# Patient Record
Sex: FEMALE | Race: WHITE | NOT HISPANIC OR LATINO
[De-identification: names, ages, dates, MRNs, and addresses within clinical notes are randomized per-mention and may not be internally consistent; named-entity substitution may affect disease eponyms.]

---
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
## 2018-08-07 ENCOUNTER — APPOINTMENT (OUTPATIENT)
Dept: OBGYN | Facility: CLINIC | Age: 18
End: 2018-08-07
Payer: COMMERCIAL

## 2018-08-07 VITALS
HEART RATE: 89 BPM | HEIGHT: 63.5 IN | WEIGHT: 146.5 LBS | DIASTOLIC BLOOD PRESSURE: 82 MMHG | SYSTOLIC BLOOD PRESSURE: 140 MMHG | BODY MASS INDEX: 25.64 KG/M2

## 2018-08-07 DIAGNOSIS — N92.6 IRREGULAR MENSTRUATION, UNSPECIFIED: ICD-10-CM

## 2018-08-07 DIAGNOSIS — N94.6 DYSMENORRHEA, UNSPECIFIED: ICD-10-CM

## 2018-08-07 DIAGNOSIS — Z78.9 OTHER SPECIFIED HEALTH STATUS: ICD-10-CM

## 2018-08-07 DIAGNOSIS — N92.0 EXCESSIVE AND FREQUENT MENSTRUATION WITH REGULAR CYCLE: ICD-10-CM

## 2018-08-07 DIAGNOSIS — N94.2 VAGINISMUS: ICD-10-CM

## 2018-08-07 PROCEDURE — 99203 OFFICE O/P NEW LOW 30 MIN: CPT

## 2018-08-07 RX ORDER — PREDNISONE 20 MG/1
20 TABLET ORAL
Qty: 16 | Refills: 0 | Status: COMPLETED | COMMUNITY
Start: 2018-05-30

## 2018-08-07 RX ORDER — AMOXICILLIN 875 MG/1
875 TABLET, FILM COATED ORAL
Qty: 20 | Refills: 0 | Status: COMPLETED | COMMUNITY
Start: 2018-03-14

## 2018-08-07 RX ORDER — AZITHROMYCIN 250 MG/1
250 TABLET, FILM COATED ORAL
Qty: 6 | Refills: 0 | Status: COMPLETED | COMMUNITY
Start: 2018-05-26

## 2018-08-08 ENCOUNTER — TRANSCRIPTION ENCOUNTER (OUTPATIENT)
Age: 18
End: 2018-08-08

## 2018-08-08 LAB
C TRACH RRNA SPEC QL NAA+PROBE: NOT DETECTED
N GONORRHOEA RRNA SPEC QL NAA+PROBE: NOT DETECTED
SOURCE AMPLIFICATION: NORMAL

## 2018-08-09 ENCOUNTER — ASOB RESULT (OUTPATIENT)
Age: 18
End: 2018-08-09

## 2018-08-09 ENCOUNTER — APPOINTMENT (OUTPATIENT)
Dept: OBGYN | Facility: CLINIC | Age: 18
End: 2018-08-09
Payer: COMMERCIAL

## 2018-08-09 PROCEDURE — 76856 US EXAM PELVIC COMPLETE: CPT

## 2019-06-26 ENCOUNTER — APPOINTMENT (OUTPATIENT)
Dept: ALLERGY | Facility: CLINIC | Age: 19
End: 2019-06-26
Payer: COMMERCIAL

## 2019-06-26 VITALS
HEIGHT: 63.5 IN | WEIGHT: 154 LBS | OXYGEN SATURATION: 98 % | DIASTOLIC BLOOD PRESSURE: 70 MMHG | SYSTOLIC BLOOD PRESSURE: 120 MMHG | RESPIRATION RATE: 16 BRPM | HEART RATE: 85 BPM | BODY MASS INDEX: 26.95 KG/M2

## 2019-06-26 PROCEDURE — 95018 ALL TSTG PERQ&IQ DRUGS/BIOL: CPT

## 2019-06-26 PROCEDURE — 99204 OFFICE O/P NEW MOD 45 MIN: CPT | Mod: 25

## 2019-06-26 PROCEDURE — 31231 NASAL ENDOSCOPY DX: CPT

## 2019-06-26 PROCEDURE — 94060 EVALUATION OF WHEEZING: CPT

## 2019-06-26 PROCEDURE — 95004 PERQ TESTS W/ALRGNC XTRCS: CPT

## 2019-06-26 RX ORDER — METHOCARBAMOL 500 MG/1
500 TABLET, FILM COATED ORAL
Qty: 5 | Refills: 0 | Status: DISCONTINUED | COMMUNITY
Start: 2018-06-05 | End: 2019-06-26

## 2019-06-26 RX ORDER — NORETHINDRONE 0.35 MG/1
0.35 TABLET ORAL DAILY
Qty: 84 | Refills: 3 | Status: DISCONTINUED | COMMUNITY
Start: 2018-08-07 | End: 2019-06-26

## 2019-06-26 NOTE — SOCIAL HISTORY
[Mother] : mother [Father] : father [House] : [unfilled] lives in a house  [Radiator/Baseboard] : heating provided by radiator(s)/baseboard(s) [Central] : air conditioning provided by central unit [Dog] : dog [Single] : single [Smokers in Household] : there are smokers in the home [Bedroom] : not in the bedroom [FreeTextEntry2] : [facility]  [de-identified] : vapes in the home on occasion [Basement] : not in the basement [Living Area] : not in the living area

## 2019-06-26 NOTE — PHYSICAL EXAM
[Alert] : alert [Well Nourished] : well nourished [Healthy Appearance] : healthy appearance [No Acute Distress] : no acute distress [Well Developed] : well developed [Normal Pupil & Iris Size/Symmetry] : normal pupil and iris size and symmetry [No Discharge] : no discharge [No Photophobia] : no photophobia [Sclera Not Icteric] : sclera not icteric [Normal Nasal Mucosa] : the nasal mucosa was normal [Normal Lips/Tongue] : the lips and tongue were normal [Normal Tonsils] : normal tonsils [Normal Dentition] : normal dentition [No Oral Lesions or Ulcers] : no oral lesions or ulcers [No Neck Mass] : no neck mass was observed [No LAD] : no lymphadenopathy [No Thyroid Mass] : no thyroid mass [Supple] : the neck was supple [Normal Rate and Effort] : normal respiratory rhythm and effort [Normal Palpation] : palpation of the chest revealed no abnormalities [No Crackles] : no crackles [No Retractions] : no retractions [Bilateral Audible Breath Sounds] : bilateral audible breath sounds [Normal Rate] : heart rate was normal  [Normal S1, S2] : normal S1 and S2 [Regular Rhythm] : with a regular rhythm [Normal Cervical Lymph Nodes] : cervical [Normal Axillary Lumph Nodes] : axillary [Skin Intact] : skin intact  [No Rash] : no rash [No Skin Lesions] : no skin lesions [No Joint Swelling or Erythema] : no joint swelling or erythema [No clubbing] : no clubbing [No Edema] : no edema [No Cyanosis] : no cyanosis [Normal Mood] : mood was normal [Normal Affect] : affect was normal [Alert, Awake, Oriented as Age-Appropriate] : alert, awake, oriented as age appropriate [Boggy Nasal Turbinates] : no boggy and/or pale nasal turbinates [Posterior Pharyngeal Cobblestoning] : no posterior pharyngeal cobblestoning [de-identified] : soft systolic murmur

## 2019-06-26 NOTE — ASSESSMENT
[FreeTextEntry1] : Recurrent sinusitis - deviated septum\par \par Hypertonic saline TID\par Flonase QD\par RV environmental intradermal skin testing \par \par Perennial allergic rhinitis:\par \par Mild intermittent asthma:\par \par Proventil 2 puffs QID prn \par \par Heart murmur:  I will give Ms. Finn a cardiologist for follow up

## 2019-06-26 NOTE — IMPRESSION
[Spirometry] : Spirometry [Reversible] :  with reversibility. [Mild] : (mild) [Allergy Testing Dog] : dog [Allergy Testing Dust Mite] : dust mites [Allergy Testing Cockroach] : cockroach [Allergy Testing Cat] : cat [] : molds [Allergy Testing Trees] : trees [Allergy Testing Weeds] : weeds [FreeTextEntry2] : deviated septum\par septal spur [Allergy Testing Grasses] : grasses

## 2019-07-10 ENCOUNTER — APPOINTMENT (OUTPATIENT)
Dept: ALLERGY | Facility: CLINIC | Age: 19
End: 2019-07-10
Payer: COMMERCIAL

## 2019-07-10 VITALS
WEIGHT: 154 LBS | HEIGHT: 63.5 IN | DIASTOLIC BLOOD PRESSURE: 80 MMHG | BODY MASS INDEX: 26.95 KG/M2 | RESPIRATION RATE: 16 BRPM | SYSTOLIC BLOOD PRESSURE: 110 MMHG | HEART RATE: 74 BPM | OXYGEN SATURATION: 98 %

## 2019-07-10 PROCEDURE — 95018 ALL TSTG PERQ&IQ DRUGS/BIOL: CPT

## 2019-07-10 PROCEDURE — 95024 IQ TESTS W/ALLERGENIC XTRCS: CPT

## 2019-07-10 PROCEDURE — 99213 OFFICE O/P EST LOW 20 MIN: CPT | Mod: 25

## 2019-07-10 NOTE — PHYSICAL EXAM
[Alert] : alert [Well Nourished] : well nourished [No Acute Distress] : no acute distress [Healthy Appearance] : healthy appearance [Well Developed] : well developed [No Discharge] : no discharge [Normal Pupil & Iris Size/Symmetry] : normal pupil and iris size and symmetry [No Photophobia] : no photophobia [Sclera Not Icteric] : sclera not icteric [Normal Nasal Mucosa] : the nasal mucosa was normal [Normal Tonsils] : normal tonsils [Normal Lips/Tongue] : the lips and tongue were normal [No Oral Lesions or Ulcers] : no oral lesions or ulcers [Normal Dentition] : normal dentition [Boggy Nasal Turbinates] : no boggy and/or pale nasal turbinates [Posterior Pharyngeal Cobblestoning] : no posterior pharyngeal cobblestoning [No Neck Mass] : no neck mass was observed [No LAD] : no lymphadenopathy [No Thyroid Mass] : no thyroid mass [Supple] : the neck was supple [Normal Rate and Effort] : normal respiratory rhythm and effort [Normal Palpation] : palpation of the chest revealed no abnormalities [No Crackles] : no crackles [No Retractions] : no retractions [Bilateral Audible Breath Sounds] : bilateral audible breath sounds [Normal Rate] : heart rate was normal  [Normal S1, S2] : normal S1 and S2 [Normal Cervical Lymph Nodes] : cervical [Regular Rhythm] : with a regular rhythm [Normal Axillary Lumph Nodes] : axillary [Skin Intact] : skin intact  [No Rash] : no rash [No Skin Lesions] : no skin lesions [No Joint Swelling or Erythema] : no joint swelling or erythema [No clubbing] : no clubbing [No Edema] : no edema [No Cyanosis] : no cyanosis [Normal Affect] : affect was normal [Normal Mood] : mood was normal [Alert, Awake, Oriented as Age-Appropriate] : alert, awake, oriented as age appropriate [de-identified] : soft systolic murmur

## 2019-07-10 NOTE — ASSESSMENT
[FreeTextEntry1] : Perennial allergic rhinitis:\par \par Continue hypertonic saline\par Continue Flonase \par I have reviewed the option of Odactra with patient and patient has information booklet\par Dust mite avoidance reviewed.

## 2019-07-10 NOTE — SOCIAL HISTORY
[FreeTextEntry2] : [facility]  [Father] : father [Mother] : mother [Radiator/Baseboard] : heating provided by radiator(s)/baseboard(s) [House] : [unfilled] lives in a house  [Central] : air conditioning provided by central unit [Basement] : not in the basement [Bedroom] : not in the bedroom [Living Area] : not in the living area [Dog] : dog [de-identified] : vapes in the home on occasion [Single] : single [Smokers in Household] : there are smokers in the home

## 2019-07-10 NOTE — HISTORY OF PRESENT ILLNESS
[Venom Reactions] : venom reactions [Eczematous rashes] : eczematous rashes [de-identified] : Patient has improved with hypertonic saline and Flonase.   No chest symptoms.  [Food Allergies] : food allergies

## 2020-11-23 ENCOUNTER — TRANSCRIPTION ENCOUNTER (OUTPATIENT)
Age: 20
End: 2020-11-23

## 2021-02-23 NOTE — HISTORY OF PRESENT ILLNESS
[Eczematous rashes] : eczematous rashes [Venom Reactions] : venom reactions [Food Allergies] : food allergies [de-identified] : Patient reports AM and PM nasal congestion ongoing x 6 months - prior to this time she had milder symptoms.  In the AM she feels a sense of PND and gagging with clear mucus.   When she gets a URI she will have a sinus infection - sinus pressure - congestion - headaches - discolored mucus - treated with antibiotics Q 3 months.    No season allergies. \par \par Patient with EIB and asthma with URI.   She will add QVAR when she has a URI.   She will use rescue inhaler before exercise. \par \par Patient uses Flonase only.  Birth Control Pills Pregnancy And Lactation Text: This medication should be avoided if pregnant and for the first 30 days post-partum.

## 2021-05-20 ENCOUNTER — APPOINTMENT (OUTPATIENT)
Dept: PLASTIC SURGERY | Facility: CLINIC | Age: 21
End: 2021-05-20
Payer: COMMERCIAL

## 2021-05-20 VITALS
HEART RATE: 118 BPM | HEIGHT: 63 IN | OXYGEN SATURATION: 97 % | DIASTOLIC BLOOD PRESSURE: 84 MMHG | SYSTOLIC BLOOD PRESSURE: 144 MMHG | TEMPERATURE: 98.1 F | WEIGHT: 155 LBS | BODY MASS INDEX: 27.46 KG/M2

## 2021-05-20 DIAGNOSIS — J45.990 EXERCISE INDUCED BRONCHOSPASM: ICD-10-CM

## 2021-05-20 PROCEDURE — 99203 OFFICE O/P NEW LOW 30 MIN: CPT

## 2021-05-20 PROCEDURE — 99072 ADDL SUPL MATRL&STAF TM PHE: CPT

## 2021-05-20 NOTE — PHYSICAL EXAM
[de-identified] : alert, calm, cooperative\par  [de-identified] : respirations are even and unlabored\par  [de-identified] : large, pendulous breasts.  No masses, tenderness upon palpation, or nipple discharge appreciated.  Grade III ptosis bilaterally.  Right breast lower than left, left breast larger than right.  \par Measurements:\par Sternal notch to nipple:  Right Breast- 32 cm, Left Breast- 30.5 cm\par Nipple to IMF: Right Breast- 21 cm, Left Breast- 24 cm\par Base Width:  12 cm bilaterally\par Ptosis Grade:  III bilaterally\par \par There is significant breast asymmetry.

## 2021-05-20 NOTE — HISTORY OF PRESENT ILLNESS
[FreeTextEntry1] : Jenna is a 20 year old female who presents for an initial evaluation.  She is accompanied by her mother.  Patient complains of macromastia.  Current bra size is 34 HH, but she feels this bra size is still too small.  Her main complaints are migraine headaches, neck, shoulder, and back pain, intertrigo of the inframammary folds.  She suffered from leg pain last year and was evaluated by a rheumatologist, who felt this was likely related to her back pain secondary to macromastia.  At that time she also underwent lower extremity dopplers to rule out DVT, which was negative.  Medical history is notable for exercise induced asthma, migraines.  Surgical history is notable for tonsillectomy and adenoidectomy, 3rd molar extraction x 4.  \par \par The patient has failed conservative treatment including supportive garments (special support bras with wide straps), NSAIDS, weight loss, dressing changes, and antifungals. Conservative treatment has been attempted for six months. In addition, the patient has required systemic antibiotics causing functional impairment (missed work and school days).  The patient has also attempted physical therapy, with little relief.  \par \par All other providers including dermatology, physical therapy, and primary care have all indicated that breast reduction is the only options for this patient.  These providers will no longer offer any more conservative treatment, as this has failed for over 6 months.\par \par \par

## 2021-05-20 NOTE — ASSESSMENT
[FreeTextEntry1] : Documented by Aileen Mccall NP acting as a scribe for Skyler Das MD. All medical record entries made by the Scribe were at my, Dr. Skyler Das, direction and personally dictated by me. I have reviewed the chart and agree that the record accurately reflects my personal performance of the history, physical exam, assessment and plan.\par

## 2021-06-22 ENCOUNTER — OUTPATIENT (OUTPATIENT)
Dept: OUTPATIENT SERVICES | Facility: HOSPITAL | Age: 21
LOS: 1 days | End: 2021-06-22
Payer: COMMERCIAL

## 2021-06-22 VITALS
HEIGHT: 63 IN | TEMPERATURE: 99 F | DIASTOLIC BLOOD PRESSURE: 81 MMHG | SYSTOLIC BLOOD PRESSURE: 126 MMHG | WEIGHT: 158.73 LBS | OXYGEN SATURATION: 100 % | RESPIRATION RATE: 14 BRPM | HEART RATE: 84 BPM

## 2021-06-22 DIAGNOSIS — M54.6 PAIN IN THORACIC SPINE: ICD-10-CM

## 2021-06-22 DIAGNOSIS — N62 HYPERTROPHY OF BREAST: ICD-10-CM

## 2021-06-22 DIAGNOSIS — Z90.89 ACQUIRED ABSENCE OF OTHER ORGANS: Chronic | ICD-10-CM

## 2021-06-22 DIAGNOSIS — Z01.818 ENCOUNTER FOR OTHER PREPROCEDURAL EXAMINATION: ICD-10-CM

## 2021-06-22 DIAGNOSIS — M54.2 CERVICALGIA: ICD-10-CM

## 2021-06-22 DIAGNOSIS — J45.990 EXERCISE INDUCED BRONCHOSPASM: ICD-10-CM

## 2021-06-22 DIAGNOSIS — L30.4 ERYTHEMA INTERTRIGO: ICD-10-CM

## 2021-06-22 LAB
HCG SERPL-ACNC: <1 MIU/ML — SIGNIFICANT CHANGE UP
HCT VFR BLD CALC: 42.2 % — SIGNIFICANT CHANGE UP (ref 34.5–45)
HGB BLD-MCNC: 14.3 G/DL — SIGNIFICANT CHANGE UP (ref 11.5–15.5)
MCHC RBC-ENTMCNC: 30.2 PG — SIGNIFICANT CHANGE UP (ref 27–34)
MCHC RBC-ENTMCNC: 33.9 GM/DL — SIGNIFICANT CHANGE UP (ref 32–36)
MCV RBC AUTO: 89 FL — SIGNIFICANT CHANGE UP (ref 80–100)
NRBC # BLD: 0 /100 WBCS — SIGNIFICANT CHANGE UP (ref 0–0)
PLATELET # BLD AUTO: 341 K/UL — SIGNIFICANT CHANGE UP (ref 150–400)
RBC # BLD: 4.74 M/UL — SIGNIFICANT CHANGE UP (ref 3.8–5.2)
RBC # FLD: 12.5 % — SIGNIFICANT CHANGE UP (ref 10.3–14.5)
WBC # BLD: 7.59 K/UL — SIGNIFICANT CHANGE UP (ref 3.8–10.5)
WBC # FLD AUTO: 7.59 K/UL — SIGNIFICANT CHANGE UP (ref 3.8–10.5)

## 2021-06-22 PROCEDURE — 85027 COMPLETE CBC AUTOMATED: CPT

## 2021-06-22 PROCEDURE — G0463: CPT

## 2021-06-22 PROCEDURE — 36415 COLL VENOUS BLD VENIPUNCTURE: CPT

## 2021-06-22 PROCEDURE — 84702 CHORIONIC GONADOTROPIN TEST: CPT

## 2021-06-22 RX ORDER — SODIUM CHLORIDE 9 MG/ML
1000 INJECTION, SOLUTION INTRAVENOUS
Refills: 0 | Status: DISCONTINUED | OUTPATIENT
Start: 2021-06-25 | End: 2021-07-09

## 2021-06-22 NOTE — H&P PST ADULT - HISTORY OF PRESENT ILLNESS
21yo female with medical h/o Asthma c/o Large breast causing pain to neck and back. Pt presents today for PST for scheduled Bilateral Breast Reduction on 6/25/2021

## 2021-06-22 NOTE — H&P PST ADULT - NSICDXPROBLEM_GEN_ALL_CORE_FT
PROBLEM DIAGNOSES  Problem: Hypertrophy of breast  Assessment and Plan: Pre-op instructions given. Pt verbalized understanding  Chlorhexidine wash instructions given  Covid vaccine proof in chart   Pending: M/C (requested by surgeon pt states)    Problem: Cervicalgia  Assessment and Plan: noted    Problem: Asthma, exercise induced  Assessment and Plan: Pt instructed to take meds

## 2021-06-22 NOTE — H&P PST ADULT - NSANTHOSAYNRD_GEN_A_CORE
neck 12 3/4inches/No. ANETA screening performed.  STOP BANG Legend: 0-2 = LOW Risk; 3-4 = INTERMEDIATE Risk; 5-8 = HIGH Risk

## 2021-06-22 NOTE — H&P PST ADULT - NSICDXPASTMEDICALHX_GEN_ALL_CORE_FT
PAST MEDICAL HISTORY:  Asthma exercise induced    Elbow Dislocation fracture    Foot Fracture right    Large breasts     Thumb Fracture left    Wrist Fracture right

## 2021-06-24 ENCOUNTER — APPOINTMENT (OUTPATIENT)
Dept: PLASTIC SURGERY | Facility: CLINIC | Age: 21
End: 2021-06-24
Payer: COMMERCIAL

## 2021-06-24 ENCOUNTER — TRANSCRIPTION ENCOUNTER (OUTPATIENT)
Age: 21
End: 2021-06-24

## 2021-06-24 PROBLEM — N62 HYPERTROPHY OF BREAST: Chronic | Status: ACTIVE | Noted: 2021-06-22

## 2021-06-24 PROBLEM — J45.909 UNSPECIFIED ASTHMA, UNCOMPLICATED: Chronic | Status: ACTIVE | Noted: 2021-06-22

## 2021-06-24 PROCEDURE — 99214 OFFICE O/P EST MOD 30 MIN: CPT | Mod: 57

## 2021-06-24 PROCEDURE — 99072 ADDL SUPL MATRL&STAF TM PHE: CPT

## 2021-06-25 ENCOUNTER — RESULT REVIEW (OUTPATIENT)
Age: 21
End: 2021-06-25

## 2021-06-25 ENCOUNTER — APPOINTMENT (OUTPATIENT)
Dept: PLASTIC SURGERY | Facility: HOSPITAL | Age: 21
End: 2021-06-25
Payer: COMMERCIAL

## 2021-06-25 ENCOUNTER — OUTPATIENT (OUTPATIENT)
Dept: OUTPATIENT SERVICES | Facility: HOSPITAL | Age: 21
LOS: 1 days | End: 2021-06-25
Payer: COMMERCIAL

## 2021-06-25 VITALS
HEART RATE: 83 BPM | DIASTOLIC BLOOD PRESSURE: 82 MMHG | SYSTOLIC BLOOD PRESSURE: 122 MMHG | TEMPERATURE: 98 F | RESPIRATION RATE: 16 BRPM | OXYGEN SATURATION: 98 %

## 2021-06-25 VITALS
WEIGHT: 158.73 LBS | RESPIRATION RATE: 16 BRPM | OXYGEN SATURATION: 97 % | SYSTOLIC BLOOD PRESSURE: 124 MMHG | TEMPERATURE: 97 F | HEART RATE: 96 BPM | HEIGHT: 63 IN | DIASTOLIC BLOOD PRESSURE: 83 MMHG

## 2021-06-25 DIAGNOSIS — M54.6 PAIN IN THORACIC SPINE: ICD-10-CM

## 2021-06-25 DIAGNOSIS — L30.4 ERYTHEMA INTERTRIGO: ICD-10-CM

## 2021-06-25 DIAGNOSIS — N62 HYPERTROPHY OF BREAST: ICD-10-CM

## 2021-06-25 DIAGNOSIS — M54.2 CERVICALGIA: ICD-10-CM

## 2021-06-25 DIAGNOSIS — Z90.89 ACQUIRED ABSENCE OF OTHER ORGANS: Chronic | ICD-10-CM

## 2021-06-25 PROCEDURE — 88305 TISSUE EXAM BY PATHOLOGIST: CPT

## 2021-06-25 PROCEDURE — 19318 BREAST REDUCTION: CPT | Mod: 50

## 2021-06-25 PROCEDURE — 88305 TISSUE EXAM BY PATHOLOGIST: CPT | Mod: 26

## 2021-06-25 PROCEDURE — C1889: CPT

## 2021-06-25 PROCEDURE — 19318 BREAST REDUCTION: CPT | Mod: AS,RT

## 2021-06-25 PROCEDURE — 19318 BREAST REDUCTION: CPT | Mod: LT

## 2021-06-25 RX ORDER — SODIUM CHLORIDE 9 MG/ML
1000 INJECTION, SOLUTION INTRAVENOUS
Refills: 0 | Status: DISCONTINUED | OUTPATIENT
Start: 2021-06-25 | End: 2021-06-25

## 2021-06-25 RX ORDER — HYDROMORPHONE HYDROCHLORIDE 2 MG/ML
0.5 INJECTION INTRAMUSCULAR; INTRAVENOUS; SUBCUTANEOUS
Refills: 0 | Status: DISCONTINUED | OUTPATIENT
Start: 2021-06-25 | End: 2021-06-25

## 2021-06-25 RX ORDER — HYDROMORPHONE HYDROCHLORIDE 2 MG/ML
1 INJECTION INTRAMUSCULAR; INTRAVENOUS; SUBCUTANEOUS
Refills: 0 | Status: DISCONTINUED | OUTPATIENT
Start: 2021-06-25 | End: 2021-06-25

## 2021-06-25 RX ORDER — FLUTICASONE PROPIONATE 50 MCG
1 SPRAY, SUSPENSION NASAL
Qty: 0 | Refills: 0 | DISCHARGE

## 2021-06-25 RX ORDER — BECLOMETHASONE DIPROPIONATE 40 UG/1
1 AEROSOL, METERED RESPIRATORY (INHALATION)
Qty: 0 | Refills: 0 | DISCHARGE

## 2021-06-25 RX ORDER — OXYCODONE HYDROCHLORIDE 5 MG/1
5 TABLET ORAL EVERY 6 HOURS
Refills: 0 | Status: DISCONTINUED | OUTPATIENT
Start: 2021-06-25 | End: 2021-06-25

## 2021-06-25 RX ORDER — ONDANSETRON 8 MG/1
4 TABLET, FILM COATED ORAL ONCE
Refills: 0 | Status: COMPLETED | OUTPATIENT
Start: 2021-06-25 | End: 2021-06-25

## 2021-06-25 RX ORDER — ALBUTEROL 90 UG/1
2 AEROSOL, METERED ORAL
Qty: 0 | Refills: 0 | DISCHARGE

## 2021-06-25 RX ADMIN — ONDANSETRON 4 MILLIGRAM(S): 8 TABLET, FILM COATED ORAL at 16:21

## 2021-06-25 RX ADMIN — HYDROMORPHONE HYDROCHLORIDE 0.5 MILLIGRAM(S): 2 INJECTION INTRAMUSCULAR; INTRAVENOUS; SUBCUTANEOUS at 15:30

## 2021-06-25 RX ADMIN — HYDROMORPHONE HYDROCHLORIDE 0.5 MILLIGRAM(S): 2 INJECTION INTRAMUSCULAR; INTRAVENOUS; SUBCUTANEOUS at 15:40

## 2021-06-25 RX ADMIN — HYDROMORPHONE HYDROCHLORIDE 0.5 MILLIGRAM(S): 2 INJECTION INTRAMUSCULAR; INTRAVENOUS; SUBCUTANEOUS at 15:52

## 2021-06-25 RX ADMIN — HYDROMORPHONE HYDROCHLORIDE 0.5 MILLIGRAM(S): 2 INJECTION INTRAMUSCULAR; INTRAVENOUS; SUBCUTANEOUS at 16:00

## 2021-06-25 RX ADMIN — SODIUM CHLORIDE 50 MILLILITER(S): 9 INJECTION, SOLUTION INTRAVENOUS at 09:33

## 2021-06-25 NOTE — ASU PATIENT PROFILE, ADULT - PMH
Asthma  exercise induced  Elbow Dislocation  fracture  Foot Fracture  right  Large breasts    Thumb Fracture  left  Wrist Fracture  right

## 2021-06-25 NOTE — ASU DISCHARGE PLAN (ADULT/PEDIATRIC) - CARE PROVIDER_API CALL
Skyler Das; JIM)  Otolaryngology; Plastic Surgery  [address] [place] [address] [address]  [place] [address]  Phone: [phone]  Fax: [phone]  Follow Up Time:

## 2021-06-25 NOTE — ASU PREOP CHECKLIST - NEEDLE GAUGE
----- Message from Marshall Alvarez MD, MD sent at 11/9/2017  8:26 AM CST -----  Zak Castro,  Will you let her know her results? Her TSH is low and free T3 is high. This can be consistent with hyperthyroidism, which can be dangerous if untreated. I would recommend referral to endocrinology- Can we do Dr. Ashwin Charlton?   Thanks!  -Marshall Alvarez MD 20

## 2021-06-25 NOTE — ASU DISCHARGE PLAN (ADULT/PEDIATRIC) - NURSING INSTRUCTIONS
Dr. Das's office will call to schedule a follow-up appointment next week. All of discharge, safety, pain management, follow up with surgeon. Verbalized understanding of all instructions.

## 2021-07-01 ENCOUNTER — APPOINTMENT (OUTPATIENT)
Dept: PLASTIC SURGERY | Facility: CLINIC | Age: 21
End: 2021-07-01
Payer: COMMERCIAL

## 2021-07-01 PROCEDURE — 99024 POSTOP FOLLOW-UP VISIT: CPT

## 2021-07-02 LAB — SURGICAL PATHOLOGY STUDY: SIGNIFICANT CHANGE UP

## 2021-07-09 ENCOUNTER — APPOINTMENT (OUTPATIENT)
Dept: PLASTIC SURGERY | Facility: CLINIC | Age: 21
End: 2021-07-09
Payer: COMMERCIAL

## 2021-07-09 PROCEDURE — 99024 POSTOP FOLLOW-UP VISIT: CPT

## 2021-07-14 ENCOUNTER — APPOINTMENT (OUTPATIENT)
Dept: PLASTIC SURGERY | Facility: CLINIC | Age: 21
End: 2021-07-14
Payer: COMMERCIAL

## 2021-07-14 PROCEDURE — 99024 POSTOP FOLLOW-UP VISIT: CPT

## 2021-07-28 ENCOUNTER — APPOINTMENT (OUTPATIENT)
Dept: PLASTIC SURGERY | Facility: CLINIC | Age: 21
End: 2021-07-28
Payer: COMMERCIAL

## 2021-07-28 VITALS
HEART RATE: 100 BPM | WEIGHT: 151 LBS | DIASTOLIC BLOOD PRESSURE: 85 MMHG | SYSTOLIC BLOOD PRESSURE: 114 MMHG | BODY MASS INDEX: 26.75 KG/M2 | HEIGHT: 63 IN | OXYGEN SATURATION: 98 % | TEMPERATURE: 98.8 F

## 2021-07-28 PROCEDURE — 99024 POSTOP FOLLOW-UP VISIT: CPT

## 2021-08-11 ENCOUNTER — APPOINTMENT (OUTPATIENT)
Dept: PLASTIC SURGERY | Facility: CLINIC | Age: 21
End: 2021-08-11
Payer: COMMERCIAL

## 2021-08-11 PROCEDURE — 99024 POSTOP FOLLOW-UP VISIT: CPT

## 2021-10-04 ENCOUNTER — APPOINTMENT (OUTPATIENT)
Dept: PLASTIC SURGERY | Facility: CLINIC | Age: 21
End: 2021-10-04

## 2021-10-06 ENCOUNTER — APPOINTMENT (OUTPATIENT)
Dept: PLASTIC SURGERY | Facility: CLINIC | Age: 21
End: 2021-10-06
Payer: COMMERCIAL

## 2021-10-06 PROCEDURE — 99212 OFFICE O/P EST SF 10 MIN: CPT

## 2021-12-22 ENCOUNTER — APPOINTMENT (OUTPATIENT)
Dept: PLASTIC SURGERY | Facility: CLINIC | Age: 21
End: 2021-12-22
Payer: COMMERCIAL

## 2021-12-22 VITALS — HEIGHT: 63 IN | BODY MASS INDEX: 26.75 KG/M2 | WEIGHT: 151 LBS | TEMPERATURE: 97.9 F

## 2021-12-22 PROCEDURE — 99213 OFFICE O/P EST LOW 20 MIN: CPT

## 2022-06-27 ENCOUNTER — APPOINTMENT (OUTPATIENT)
Dept: PLASTIC SURGERY | Facility: CLINIC | Age: 22
End: 2022-06-27
Payer: COMMERCIAL

## 2022-06-27 VITALS
DIASTOLIC BLOOD PRESSURE: 84 MMHG | HEIGHT: 63 IN | SYSTOLIC BLOOD PRESSURE: 128 MMHG | WEIGHT: 150 LBS | BODY MASS INDEX: 26.58 KG/M2 | OXYGEN SATURATION: 98 % | TEMPERATURE: 98.1 F | HEART RATE: 84 BPM

## 2022-06-27 DIAGNOSIS — N62 HYPERTROPHY OF BREAST: ICD-10-CM

## 2022-06-27 PROCEDURE — 99024 POSTOP FOLLOW-UP VISIT: CPT

## 2022-08-02 NOTE — H&P PST ADULT - ENMT
What Type Of Note Output Would You Prefer (Optional)?: Standard Output What Is The Reason For Today's Visit?: Full Body Skin Examination What Is The Reason For Today's Visit? (Being Monitored For X): concerning skin lesions on an annual basis No oral lesions; no gross abnormalities negative

## 2022-12-18 ENCOUNTER — NON-APPOINTMENT (OUTPATIENT)
Age: 22
End: 2022-12-18

## 2023-03-14 ENCOUNTER — LABORATORY RESULT (OUTPATIENT)
Age: 23
End: 2023-03-14

## 2023-03-14 ENCOUNTER — APPOINTMENT (OUTPATIENT)
Dept: PULMONOLOGY | Facility: CLINIC | Age: 23
End: 2023-03-14
Payer: COMMERCIAL

## 2023-03-14 VITALS
BODY MASS INDEX: 26.22 KG/M2 | SYSTOLIC BLOOD PRESSURE: 136 MMHG | OXYGEN SATURATION: 98 % | HEIGHT: 63 IN | DIASTOLIC BLOOD PRESSURE: 84 MMHG | HEART RATE: 88 BPM | WEIGHT: 148 LBS

## 2023-03-14 DIAGNOSIS — J45.30 MILD PERSISTENT ASTHMA, UNCOMPLICATED: ICD-10-CM

## 2023-03-14 PROCEDURE — 99203 OFFICE O/P NEW LOW 30 MIN: CPT | Mod: 25

## 2023-03-14 PROCEDURE — 94729 DIFFUSING CAPACITY: CPT

## 2023-03-14 PROCEDURE — 95012 NITRIC OXIDE EXP GAS DETER: CPT

## 2023-03-14 PROCEDURE — ZZZZZ: CPT

## 2023-03-14 PROCEDURE — 94726 PLETHYSMOGRAPHY LUNG VOLUMES: CPT

## 2023-03-14 PROCEDURE — 94060 EVALUATION OF WHEEZING: CPT

## 2023-03-14 NOTE — HISTORY OF PRESENT ILLNESS
[TextBox_4] : 22 y.o. female with esophageal ulcer, migraines hx of mild persistent asthma here to establish care. The patient states she recently was recovering from a prolonged URI with cough, wheezing and dyspnea from January to February. States the cough was productive at that time with greenish sputum. She was treated with a course of prednisone, ceftin and cough syrup. Currently the patient has recovered and is asymptomatic. States during the change in seasons, after exercise and when she is sick, she wll get cough, congestion, shortness of breath and wheezing. After exercise she we will take her albuterol HFA but feels little relief. She is not on any controller medication, and was previously on Qvar years ago. The patient was seen by Allergy and Immunology in 2019 and was found to be allergic to dust mites. \par \par She is a never smoker and only drinks EToH socially. Works as a guidance counselor. No history of asthma exacerbations requiring hospitalizations or ED visits.

## 2023-03-14 NOTE — ASSESSMENT
[FreeTextEntry1] : 22 y.o. female with esophageal ulcer, migraines, mild persistent asthma here to establish care. PFT overall normal and FeNO as 12. Symptoms are provoked by exercise and by URIs.  Mild intermittent asthma. \par \par Plan \par - Start Symbicort PRN for SOB/wheezing \par - Check IgE, Asthma profile and CBC with Diff\par - Azelastine .1% nasal spray for PND\par - Follow up in 1 week\par \par d/w Dr. Cerda

## 2023-03-21 LAB
A ALTERNATA IGE QN: <0.1 KUA/L
A FUMIGATUS IGE QN: <0.1 KUA/L
BASOPHILS # BLD AUTO: 0.04 K/UL
BASOPHILS NFR BLD AUTO: 0.4 %
BERMUDA GRASS IGE QN: <0.1 KUA/L
BOXELDER IGE QN: <0.1 KUA/L
C HERBARUM IGE QN: <0.1 KUA/L
CALIF WALNUT IGE QN: <0.1 KUA/L
CAT DANDER IGE QN: <0.1 KUA/L
CMN PIGWEED IGE QN: <0.1 KUA/L
COMMON RAGWEED IGE QN: <0.1 KUA/L
COTTONWOOD IGE QN: <0.1 KUA/L
D FARINAE IGE QN: 0.17 KUA/L
D PTERONYSS IGE QN: 0.15 KUA/L
DEPRECATED A ALTERNATA IGE RAST QL: 0
DEPRECATED A FUMIGATUS IGE RAST QL: 0
DEPRECATED BERMUDA GRASS IGE RAST QL: 0
DEPRECATED BOXELDER IGE RAST QL: 0
DEPRECATED C HERBARUM IGE RAST QL: 0
DEPRECATED CAT DANDER IGE RAST QL: 0
DEPRECATED COMMON PIGWEED IGE RAST QL: 0
DEPRECATED COMMON RAGWEED IGE RAST QL: 0
DEPRECATED COTTONWOOD IGE RAST QL: 0
DEPRECATED D FARINAE IGE RAST QL: NORMAL
DEPRECATED D PTERONYSS IGE RAST QL: NORMAL
DEPRECATED DOG DANDER IGE RAST QL: 0
DEPRECATED GOOSEFOOT IGE RAST QL: NORMAL
DEPRECATED LONDON PLANE IGE RAST QL: NORMAL
DEPRECATED MOUSE URINE PROT IGE RAST QL: 0
DEPRECATED MUGWORT IGE RAST QL: 0
DEPRECATED P NOTATUM IGE RAST QL: 0
DEPRECATED RED CEDAR IGE RAST QL: NORMAL
DEPRECATED ROACH IGE RAST QL: 0
DEPRECATED SHEEP SORREL IGE RAST QL: 0
DEPRECATED SILVER BIRCH IGE RAST QL: 0
DEPRECATED TIMOTHY IGE RAST QL: 0
DEPRECATED WHITE ASH IGE RAST QL: 0
DEPRECATED WHITE OAK IGE RAST QL: NORMAL
DOG DANDER IGE QN: <0.1 KUA/L
EOSINOPHIL # BLD AUTO: 0.09 K/UL
EOSINOPHIL NFR BLD AUTO: 1 %
GOOSEFOOT IGE QN: 0.11 KUA/L
HCT VFR BLD CALC: 42 %
HGB BLD-MCNC: 14 G/DL
IMM GRANULOCYTES NFR BLD AUTO: 0.2 %
LONDON PLANE IGE QN: 0.11 KUA/L
LYMPHOCYTES # BLD AUTO: 2.64 K/UL
LYMPHOCYTES NFR BLD AUTO: 29.7 %
MAN DIFF?: NORMAL
MCHC RBC-ENTMCNC: 30.1 PG
MCHC RBC-ENTMCNC: 33.3 GM/DL
MCV RBC AUTO: 90.3 FL
MONOCYTES # BLD AUTO: 0.37 K/UL
MONOCYTES NFR BLD AUTO: 4.2 %
MOUSE URINE PROT IGE QN: <0.1 KUA/L
MUGWORT IGE QN: <0.1 KUA/L
MULBERRY (T70) CLASS: 0
MULBERRY (T70) CONC: <0.1 KUA/L
NEUTROPHILS # BLD AUTO: 5.73 K/UL
NEUTROPHILS NFR BLD AUTO: 64.5 %
P NOTATUM IGE QN: <0.1 KUA/L
PLATELET # BLD AUTO: 428 K/UL
RBC # BLD: 4.65 M/UL
RBC # FLD: 12.9 %
RED CEDAR IGE QN: 0.11 KUA/L
ROACH IGE QN: <0.1 KUA/L
SHEEP SORREL IGE QN: <0.1 KUA/L
SILVER BIRCH IGE QN: <0.1 KUA/L
TIMOTHY IGE QN: <0.1 KUA/L
TOTAL IGE SMQN RAST: 9 KU/L
TREE ALLERG MIX1 IGE QL: 0
WBC # FLD AUTO: 8.89 K/UL
WHITE ASH IGE QN: <0.1 KUA/L
WHITE ELM IGE QN: 0
WHITE ELM IGE QN: <0.1 KUA/L
WHITE OAK IGE QN: 0.15 KUA/L

## 2023-04-12 ENCOUNTER — APPOINTMENT (OUTPATIENT)
Dept: PULMONOLOGY | Facility: CLINIC | Age: 23
End: 2023-04-12

## 2023-07-14 ENCOUNTER — APPOINTMENT (OUTPATIENT)
Dept: PODIATRY | Facility: CLINIC | Age: 23
End: 2023-07-14
Payer: COMMERCIAL

## 2023-07-14 DIAGNOSIS — K22.10 ULCER OF ESOPHAGUS W/OUT BLEEDING: ICD-10-CM

## 2023-07-14 PROCEDURE — 99203 OFFICE O/P NEW LOW 30 MIN: CPT

## 2023-07-14 PROCEDURE — 73630 X-RAY EXAM OF FOOT: CPT | Mod: LT

## 2023-07-20 ENCOUNTER — APPOINTMENT (OUTPATIENT)
Dept: PODIATRY | Facility: CLINIC | Age: 23
End: 2023-07-20
Payer: COMMERCIAL

## 2023-07-20 PROCEDURE — 99213 OFFICE O/P EST LOW 20 MIN: CPT

## 2023-07-22 NOTE — CONSULT LETTER
[Dear  ___] : Dear  [unfilled], [Consult Letter:] : I had the pleasure of evaluating your patient, [unfilled]. [Please see my note below.] : Please see my note below. [Consult Closing:] : Thank you very much for allowing me to participate in the care of this patient.  If you have any questions, please do not hesitate to contact me. [Sincerely,] : Sincerely, [FreeTextEntry2] : Dr. Dutta [FreeTextEntry3] : Justus Menon DPM

## 2023-07-22 NOTE — HISTORY OF PRESENT ILLNESS
[Sneakers] : blank [FreeTextEntry1] : Patient presents today with family with a complaint of left foot pain.  This occurred after she missed a step last night, 07/13/23; she fell several steps.  Patient denies any head strike or other trauma.  Patient was unable to weight bear after the incident and is still unable to weight bear today.  She has been using crutches.  She applied an Ace wrap for swelling control.  Patient took 2 Advil last night as she had excruciating pain.  Pain is better today.  She did not feel any popping or cracking sensations.

## 2023-07-22 NOTE — ASSESSMENT
[FreeTextEntry1] : Impression: Pain in left foot (M79.672).  Sprain, left foot (S93.602A).  High suspicion for hairline fracture.\par \par Treatment: Discussed etiology and treatment options. \par An MRI of the midfoot to rearfoot was ordered to evaluate for any underlying hairline fracture as the x-rays are negative but patient is unable to weight bear.  \par Advised patient to continue non-weight bearing with crutches, ice, elevate and take Tylenol for pain, as she has an esophageal ulcer and should not be taking NSAIDs.  Continue the Ace wrap for edema control.  \par Patient is off for the summer from work and is able to rest.  \par Return: After the MRI.  \par Patient was prescribed a short Cam boot to use once the pain is improved and the MRI confirms pathology.  \par \par

## 2023-07-22 NOTE — PROCEDURE
[FreeTextEntry1] : X-rays were taken of the left foot to evaluate for fracture.\par (3 views - non-weight bearing, due to pain) No acute fractures/dislocations or erosions present.

## 2023-07-22 NOTE — PHYSICAL EXAM
[2+] : left foot dorsalis pedis 2+ [No Focal Deficits] : no focal deficits [Ankle Swelling (On Exam)] : not present [Varicose Veins Of Lower Extremities] : not present [FreeTextEntry3] : CFT: 3 seconds x 10.  Temperature gradient: warm to cool.  [de-identified] : Pain on palpation over the left metatarsal bases 3 and 4.  No pain on palpation over the Lisfranc's ligament.  Pain on palpation over the cuboid present in the anterior aspect of the calcaneus.  No pain with ankle ROM or exam.  Patient is able to plantar flex, dorsiflex, brayan and invert the foot; however, with pain.  No pain out of proportion.  Compartments are soft, supple and compressible.  Edema over the midfoot present.   [FreeTextEntry1] : Light touch intact.

## 2023-07-24 NOTE — HISTORY OF PRESENT ILLNESS
[Sneakers] : blank [FreeTextEntry1] : Patient presents today for reevaluation of left foot injury.  At this time, she had an MRI which showed a torn calcaneocuboid ligament, dorsally.  On X-rays there is some mild subluxation, medially.  She does have ecchymosis and pain.  No new changes to her medical status.

## 2023-07-24 NOTE — PHYSICAL EXAM
[General Appearance - Alert] : alert [2+] : left foot dorsalis pedis 2+ [No Focal Deficits] : no focal deficits [Oriented To Time, Place, And Person] : oriented to person, place, and time [Ankle Swelling (On Exam)] : not present [Varicose Veins Of Lower Extremities] : not present [FreeTextEntry3] : CFT: 3 seconds x 10.  Temperature gradient: warm to cool.  [de-identified] : Pain on palpation over the left metatarsal bases 3 and 4. No pain on palpation over the Lisfranc's ligament. Pain on palpation over the cuboid present in the anterior aspect of the calcaneus. No pain with ankle ROM or exam. Patient is able to plantar flex, dorsiflex, brayan and invert the foot; however, with pain. No pain out of proportion. Compartments are soft, supple and compressible. Edema over the midfoot present. \par  [FreeTextEntry1] : Right medial 1st met. superficial abrasion, 3mm x 4mm with no depth.  No drainage, no clinical signs of infection.  \par Left foot, no abrasions, no open lesions, no clinical signs of infection.

## 2023-07-24 NOTE — ASSESSMENT
[FreeTextEntry1] : Impression: Left foot sprain (B14.686T).\par \par Treatment: I feel that the patient can weight bear on the foot, which will help to keep the joint reduced in proper alignment, since the plantar ligaments are intact.  \par She is to continue the use of the Cam boot.  She should not do any plantar flexion or pointing motions, if she can avoid it. \par Will reevaluate in 2 weeks.

## 2023-08-03 ENCOUNTER — APPOINTMENT (OUTPATIENT)
Dept: PODIATRY | Facility: CLINIC | Age: 23
End: 2023-08-03
Payer: COMMERCIAL

## 2023-08-03 DIAGNOSIS — R25.2 CRAMP AND SPASM: ICD-10-CM

## 2023-08-03 DIAGNOSIS — M76.72 PERONEAL TENDINITIS, LEFT LEG: ICD-10-CM

## 2023-08-03 DIAGNOSIS — M79.672 PAIN IN LEFT FOOT: ICD-10-CM

## 2023-08-03 DIAGNOSIS — S93.602A UNSPECIFIED SPRAIN OF LEFT FOOT, INITIAL ENCOUNTER: ICD-10-CM

## 2023-08-03 PROCEDURE — 99213 OFFICE O/P EST LOW 20 MIN: CPT | Mod: 25

## 2023-08-03 PROCEDURE — 73630 X-RAY EXAM OF FOOT: CPT | Mod: LT

## 2023-08-03 PROCEDURE — 20600 DRAIN/INJ JOINT/BURSA W/O US: CPT | Mod: LT

## 2023-08-07 PROBLEM — R25.2 FOOT SPASMS: Status: ACTIVE | Noted: 2023-08-07

## 2023-08-07 NOTE — REASON FOR VISIT
[Initial Visit] : an initial visit for [Onychomycosis] : onychomycosis [Follow-Up Visit] : a follow-up visit for [Ankle Sprain] : ankle sprain

## 2023-08-08 NOTE — PHYSICAL EXAM
[General Appearance - Alert] : alert [2+] : left foot dorsalis pedis 2+ [No Focal Deficits] : no focal deficits [Oriented To Time, Place, And Person] : oriented to person, place, and time [Ankle Swelling (On Exam)] : not present [Varicose Veins Of Lower Extremities] : not present [FreeTextEntry3] : CFT: 3 seconds x 10.  Temperature gradient: warm to cool.  [de-identified] : Significant limitation of motion of the left subtalar joint that appears to be due to peroneal spasm.   [FreeTextEntry1] : Light touch intact.

## 2023-08-08 NOTE — ASSESSMENT
[FreeTextEntry1] : Impression: Left foot sprain (S93.602A).  Peroneal spasm (R25.2) secondary to the strain/sprain.  Peroneal tendonitis, left (M76.72).  Recommendations: Attempt a small injection into the left subtalar joint to break the spasm and hopefully relieve her pain.  After discussing all risks, benefits and alternatives, patient consented.  Treatment: Patient was injected with a combination of 1% Xylocaine, plain, 0.5% Marcaine, plain and Kenalog-40, a total of 2.5cc's into the left subtalar joint and sinus tarsi.  Patient was given home care instructions.  Hopefully, this relieves her pain, and she can be sent for physical therapy.   Any questions or problems, patient is to contact the office.  [Verbal] : verbal [Patient] : patient [Good - alert, interested, motivated] : Good - alert, interested, motivated [Pressure relief] : pressure relief [Signs and symptoms of infection] : sign and symptoms of infection [Off-loading] : off-loading [Pt responsibility to plan of care] : patient responsibility to plan of care

## 2023-08-08 NOTE — PHYSICAL EXAM
[General Appearance - Alert] : alert [2+] : left foot dorsalis pedis 2+ [No Focal Deficits] : no focal deficits [Oriented To Time, Place, And Person] : oriented to person, place, and time [Ankle Swelling (On Exam)] : not present [Varicose Veins Of Lower Extremities] : not present [FreeTextEntry3] : CFT: 3 seconds x 10.  Temperature gradient: warm to cool.  [de-identified] : Significant limitation of motion of the left subtalar joint that appears to be due to peroneal spasm.   [FreeTextEntry1] : Light touch intact.

## 2023-08-08 NOTE — PROCEDURE
[Other:____] : ~M [unfilled] [Risks] : risks [Benefits] : benefits [Alternatives] : alternatives [Patient] : Prior to the start of the procedure a time out was taken and the identity of the patient was confirmed via name and date of birth with the patient. The correct site and the procedure to be performed were confirmed. The correct side was confirmed if applicable. The availability of the correct equipment was verified [1%] : 1%  [Alcohol] : alcohol [25 gauge] : A 25 gauge needle was used [Kenalog 40] : Kenalog 40 [Tolerated Well] : tolerated the procedure well [No Complications] : There were no complications. [Instructions Given] : given handouts/patient instructions [Patient Instructed to Call] : instructed to call if redness at site, a decrease in range of motion or an increase in pain is noted after procedure. [FreeTextEntry1] : X-rays were taken just to reconfirm that there is no anterior process fracture.   (3 views - left foot) No evidence of any coalition noted.  No evidence of a fracture.    MRI did confirm that there is no evidence of a coalition.

## 2023-08-24 ENCOUNTER — APPOINTMENT (OUTPATIENT)
Dept: PODIATRY | Facility: CLINIC | Age: 23
End: 2023-08-24
Payer: COMMERCIAL

## 2023-08-24 PROCEDURE — 99213 OFFICE O/P EST LOW 20 MIN: CPT

## 2023-08-26 NOTE — HISTORY OF PRESENT ILLNESS
[Sneakers] : blank [FreeTextEntry1] : Patient presents today for reevaluation of an ankle sprain, left.  Patient is much improved.  Physical therapy has dramatically helped increase the motion about the ankle.  No new changes to her medical status.

## 2023-08-26 NOTE — PHYSICAL EXAM
[General Appearance - Alert] : alert [2+] : left foot dorsalis pedis 2+ [No Focal Deficits] : no focal deficits [Oriented To Time, Place, And Person] : oriented to person, place, and time [Ankle Swelling (On Exam)] : not present [Varicose Veins Of Lower Extremities] : not present [FreeTextEntry3] : CFT: 3 seconds x 10.  Temperature gradient: warm to cool.  [de-identified] : Pain is reduced.  Patient gets some discomfort when she is on her foot for any prolonged period of time. [FreeTextEntry1] : Light touch intact.

## 2023-08-26 NOTE — ASSESSMENT
[FreeTextEntry1] : Impression: Left foot sprain (S93.602A).  Peroneal spasm (R25.2) secondary to the strain/sprain.  Peroneal tendonitis, left (M76.72).  Treatment: Patient was advised to continue with physical therapy.  She is to wear a supportive bracing, especially when participating in any activities and to try to prevent any re-injury of the ankle.  Will reevaluate in one month, only if pain persists.   Any questions or problems, patient is to contact the office.

## 2023-10-23 NOTE — H&P PST ADULT - VISION (WITH CORRECTIVE LENSES IF THE PATIENT USUALLY WEARS THEM):
[employer] wears glasses/Normal vision: sees adequately in most situations; can see medication labels, newsprint

## 2023-11-06 NOTE — PRE-ANESTHESIA EVALUATION ADULT - NSANTHDISPORD_GEN_ALL_CORE
Dear Daniella Sexton,    We are sorry you are not feeling well. Based on the responses you provided, it is recommended that you be seen in-person in urgent care so we can better evaluate your symptoms. Please click here to find the nearest urgent care location to you.   You will not be charged for this Visit. Thank you for trusting us with your care.    Arjun Davey MD     [facility]

## 2024-02-29 ENCOUNTER — OUTPATIENT (OUTPATIENT)
Dept: OUTPATIENT SERVICES | Facility: HOSPITAL | Age: 24
LOS: 1 days | Discharge: HOME | End: 2024-02-29

## 2024-02-29 DIAGNOSIS — Z90.89 ACQUIRED ABSENCE OF OTHER ORGANS: Chronic | ICD-10-CM

## 2024-02-29 DIAGNOSIS — N64.59 OTHER SIGNS AND SYMPTOMS IN BREAST: ICD-10-CM

## 2024-03-08 ENCOUNTER — APPOINTMENT (OUTPATIENT)
Dept: HEMATOLOGY ONCOLOGY | Facility: CLINIC | Age: 24
End: 2024-03-08
Payer: COMMERCIAL

## 2024-03-08 ENCOUNTER — NON-APPOINTMENT (OUTPATIENT)
Age: 24
End: 2024-03-08

## 2024-03-08 VITALS
HEART RATE: 85 BPM | SYSTOLIC BLOOD PRESSURE: 120 MMHG | TEMPERATURE: 98 F | DIASTOLIC BLOOD PRESSURE: 77 MMHG | OXYGEN SATURATION: 97 % | RESPIRATION RATE: 16 BRPM | WEIGHT: 138.89 LBS | BODY MASS INDEX: 24.3 KG/M2 | HEIGHT: 63.5 IN

## 2024-03-08 DIAGNOSIS — N64.1 FAT NECROSIS OF BREAST: ICD-10-CM

## 2024-03-08 DIAGNOSIS — Z87.42 PERSONAL HISTORY OF OTHER DISEASES OF THE FEMALE GENITAL TRACT: ICD-10-CM

## 2024-03-08 DIAGNOSIS — Z80.3 FAMILY HISTORY OF MALIGNANT NEOPLASM OF BREAST: ICD-10-CM

## 2024-03-08 PROCEDURE — 99205 OFFICE O/P NEW HI 60 MIN: CPT

## 2024-03-08 RX ORDER — AZELASTINE HYDROCHLORIDE 137 UG/1
0.1 SPRAY, METERED NASAL
Qty: 1 | Refills: 5 | Status: COMPLETED | COMMUNITY
Start: 2023-03-14 | End: 2024-03-08

## 2024-03-08 RX ORDER — BUDESONIDE AND FORMOTEROL FUMARATE DIHYDRATE 160; 4.5 UG/1; UG/1
160-4.5 AEROSOL RESPIRATORY (INHALATION) TWICE DAILY
Qty: 1 | Refills: 2 | Status: COMPLETED | COMMUNITY
Start: 2023-03-14 | End: 2024-03-08

## 2024-03-08 RX ORDER — OXYCODONE AND ACETAMINOPHEN 5; 325 MG/1; MG/1
5-325 TABLET ORAL
Qty: 20 | Refills: 0 | Status: COMPLETED | COMMUNITY
Start: 2021-06-21 | End: 2024-03-08

## 2024-03-08 RX ORDER — BECLOMETHASONE DIPROPIONATE 80 UG/1
80 AEROSOL, METERED RESPIRATORY (INHALATION)
Qty: 87 | Refills: 0 | Status: COMPLETED | COMMUNITY
Start: 2018-05-26 | End: 2024-03-08

## 2024-03-08 RX ORDER — LEVONORGESTREL AND ETHINYL ESTRADIOL 0.15-0.03
0.15-0.03 KIT ORAL
Refills: 0 | Status: ACTIVE | COMMUNITY
Start: 2024-03-08

## 2024-03-08 RX ORDER — LEVONORGESTREL AND ETHINYL ESTRADIOL 0.15-0.03
KIT ORAL
Refills: 0 | Status: COMPLETED | COMMUNITY
End: 2024-03-08

## 2024-03-08 NOTE — ASSESSMENT
[FreeTextEntry1] : JENNA CHOWDHURY presented today to review her risk of developing of breast cancer based on her family history and personal risk factors.  A complete risk assessment was performed, and the results suggest that Ms. CHOWDHURY is at elevated risk for breast cancer compared to general population,  but does not qualify at this time for high-risk screening and management in the [facility].   Empiric Risk Calculations: Adan Hardwick Score: 19.6% LIFETIME risk of developing breast cancer (general population risk 12%) Hoda Risk Score:  n/a    Based on this information the following risk reduction/screening plan has been recommended:  (Per NCCN guidelines 3.2023 breast-screening. )  RISK REDUCTION PLAN: - Jenna will continue to follow with her gynecologist at this time for age-appropriate screening to include a clinical breast exam annually.    No physical exam performed today as patient since patient will not be followed at this time by the breast wellness program.  - Annual mammogram and if needed breast ultrasound (heterogeneously dense breast tissue) - DUE:  Start at age 40 (however, patient reminded she is due for 6 month unilateral sonogram 7/2024) - Annual Breast MRI:  Not recommended at this time, but could be considered when she begins age appropriate screening - Genetics:   Does not meet guideline for genetic testing - no recommended - Chemoprevention:  not recommended -  Reviewed Lifestyle Recommendations:  Limit consumption of alcoholic beverages to no more than one drink equivalent per day;  Exercise 150-300 minutes of moderate intensity physical activity per week;   Maintain a healthy body weight of 20-25 BMI to help reduce risk of breast cancer. - continue f/up with gynecologist and PCP - requested gyn referral provided contact information for Dr. Eleazar Knowles - f/up with [facility] in 5 years for follow-up risk assessment.  Patient had an opportunity to have her questions asked and answered to her satisfaction.   If there are any changes to her personal or family history, she was instructed to notify the practice for re-evaluation.

## 2024-03-08 NOTE — CONSULT LETTER
[Dear  ___] : Dear  [unfilled], [Consult Letter:] : I had the pleasure of evaluating your patient, [unfilled]. [Please see my note below.] : Please see my note below. [Consult Closing:] : Thank you very much for allowing me to participate in the care of this patient.  If you have any questions, please do not hesitate to contact me. [Sincerely,] : Sincerely, [FreeTextEntry3] : Eva Fritz PA-C [facility] Division of Hematology/[place] [address] Tel: [phone] Fax: [phone]

## 2024-03-08 NOTE — HISTORY OF PRESENT ILLNESS
[de-identified] : Ms. STACI CHOWDHURY is 23 year female referred by SELF REFERRED for breast cancer risk assessment and risk reduction management based on Her family history and/or personal risk factors for breast cancer.      RISK ASSESSMENT FACTORS:   GENERAL: Height: 63 inches Weight: 140 lbs BMI:  24 Age of Menarche:  8 Menopausal Status: Premenopausal  OCP/HRT/Fertility:  OCP x 6 years (h/o endometriosis) Age of Menopause: n/a # pregnancies/live birth:  Age of first live birth:  n/a                                                                             BREAST RISK FACTORS:                                                                                     # Breast Biopsies:  1                                                                         Results of biopsies:  fat necrosis (s/p risk reduction mammoplasty) Breast Density:  heterogeneously dense   FAMILY HISTORY:  A complete family history was obtained including first and second degree relatives.  See pedigree Family history is significant for: see pedigree --> maternal aunt/gm with breast cancer Personal Genetic Testing Results if applicable:  n/a Family Genetic Testing Results:  n/a   SOCIAL HISTORY:  Patient denies history of smoking and regular ETOH use.     IMAGING SUMMARY: Mammogram:  n/a Breast Ultrasound:  Unilateral 2024:--> bx of palpable mass--> fat necrosis:  6 mth f/up due 2024 Breast MRI: na Other Screening:  n/a   HEALTH TEAM: PCP:  Dr. Blake GYN:  Dr. Alfa Gates (retiring) BREAST SURGEON: n/a